# Patient Record
Sex: FEMALE | Race: WHITE | NOT HISPANIC OR LATINO | URBAN - METROPOLITAN AREA
[De-identification: names, ages, dates, MRNs, and addresses within clinical notes are randomized per-mention and may not be internally consistent; named-entity substitution may affect disease eponyms.]

---

## 2024-04-29 ENCOUNTER — TELEMEDICINE (OUTPATIENT)
Dept: PRIMARY CARE | Facility: CLINIC | Age: 34
End: 2024-04-29
Payer: COMMERCIAL

## 2024-04-29 DIAGNOSIS — K12.0 CANKER SORES ORAL: ICD-10-CM

## 2024-04-29 DIAGNOSIS — K21.9 GASTROESOPHAGEAL REFLUX DISEASE, UNSPECIFIED WHETHER ESOPHAGITIS PRESENT: ICD-10-CM

## 2024-04-29 DIAGNOSIS — K14.9 IRRITATION OF TONGUE: Primary | ICD-10-CM

## 2024-04-29 PROCEDURE — 99214 OFFICE O/P EST MOD 30 MIN: CPT | Mod: GT,U1,95 | Performed by: STUDENT IN AN ORGANIZED HEALTH CARE EDUCATION/TRAINING PROGRAM

## 2024-04-29 PROCEDURE — 99214 OFFICE O/P EST MOD 30 MIN: CPT | Performed by: STUDENT IN AN ORGANIZED HEALTH CARE EDUCATION/TRAINING PROGRAM

## 2024-04-29 RX ORDER — PANTOPRAZOLE SODIUM 20 MG/1
20 TABLET, DELAYED RELEASE ORAL
Qty: 30 TABLET | Refills: 0 | Status: SHIPPED | OUTPATIENT
Start: 2024-04-29 | End: 2025-04-29

## 2024-04-29 NOTE — PROGRESS NOTES
Outpatient Visit Note    No chief complaint on file.      With patient's permission, this is a Telemedicine visit with video and audio. The provider and patient participated in this telemedicine encounter.    HPI:  Iliana Kenney is a 34 y.o. female presenting with concern for swollen/inflamed taste buds for the past month.    No known food allergies/sensitivities.  Did notice worsening after Mexican food.  Also with hummus.  No significant mouth/lip/throat/tongue swelling, no shortness of breath or difficulty breathing.  Has not tried taking anything for this issue.  Denies any abdominal pain but states that throat is also irritated.    Mychart Odvv-Sore Throat    Question 4/29/2024  2:54 PM EDT - Filed by Patient   Are you currently experiencing any shortness of breath? No   Are you experiencing severe throat pain and you CANNOT swallow water, your own saliva or keep liquids down? No   Are you experiencing any wheezing? No   When did your symptoms begin? 4/5/2024   Is your temperature greater than 101° F (38.3° C) or not responding to Tylenol or Ibuprofen? No   Do your symptoms include: None of the above   Have you recently been exposed to Strep? No   Have you recently been exposed to COVID-19? No   How would you describe the throat pain? Irritated           Current Medications  No current outpatient medications     Allergies  Not on File     No past medical history on file.   No past surgical history on file.  No family history on file.     Tobacco Use: Low Risk  (2/3/2024)    Received from [facility] [place] [facility]    Patient History     Smoking Tobacco Use: Never     Smokeless Tobacco Use: Never     Passive Exposure: Never        ROS  All pertinent positive symptoms are included in the history of present illness.  All other systems have been reviewed and are negative and noncontributory to this patient's current ailments.    VITAL SIGNS  There were no vitals filed for this visit.  There were no vitals filed  for this visit.   There is no height or weight on file to calculate BMI.   Patient is unable to proivide    PHYSICAL EXAM  GENERAL APPEARANCE:  Alert and oriented x 3, Pleasant and cooperative, No acute distress.   LUNGS:  No conversational dyspnea or cough during encounter.   PSYCH:  appropriate mood and affect, no difficulty with speech.   Telemedicine visit, no other exam component done.      Assessment/Plan   Problem List Items Addressed This Visit    None  Visit Diagnoses         Codes    Irritation of tongue    -  Primary K14.9    Relevant Medications    pantoprazole (Protonix) 20 mg EC tablet    Gastroesophageal reflux disease, unspecified whether esophagitis present     K21.9    Relevant Medications    pantoprazole (Protonix) 20 mg EC tablet    Canker sores oral     K12.0            Additional Visit Plans:    Suspect irritation of tongue and canker sores may be secondary to GERD.  Will treat with pantoprazole.    Patient also counseled on lifestyle measures likely contributing to irritation including avoiding spicy/acidic foods, avoiding dry mouth by practicing good hydration habits and using a bedside humidifier if she potentially sleeps with her mouth open.    Advised in person follow-up/evaluation if symptoms not improving with these measures.    Patient Care Team:  CHRISTINE Agrawal-CNP as PCP - General (Nurse Practitioner)  Obdulio Mina DO as PCP - [employer] PCP    Michelle Rojas MD   04/29/24   3:02 PM

## 2024-05-26 DIAGNOSIS — K21.9 GASTROESOPHAGEAL REFLUX DISEASE, UNSPECIFIED WHETHER ESOPHAGITIS PRESENT: ICD-10-CM

## 2024-05-26 DIAGNOSIS — K14.9 IRRITATION OF TONGUE: ICD-10-CM

## 2024-05-28 RX ORDER — PANTOPRAZOLE SODIUM 20 MG/1
20 TABLET, DELAYED RELEASE ORAL
Qty: 30 TABLET | Refills: 0 | OUTPATIENT
Start: 2024-05-28 | End: 2025-05-28

## 2024-08-26 ENCOUNTER — OFFICE VISIT (OUTPATIENT)
Dept: PRIMARY CARE | Facility: CLINIC | Age: 34
End: 2024-08-26
Payer: COMMERCIAL

## 2024-08-26 VITALS
OXYGEN SATURATION: 99 % | BODY MASS INDEX: 27.82 KG/M2 | DIASTOLIC BLOOD PRESSURE: 80 MMHG | HEART RATE: 91 BPM | RESPIRATION RATE: 20 BRPM | SYSTOLIC BLOOD PRESSURE: 128 MMHG | WEIGHT: 175 LBS

## 2024-08-26 DIAGNOSIS — G43.809 OTHER MIGRAINE WITHOUT STATUS MIGRAINOSUS, NOT INTRACTABLE: Primary | ICD-10-CM

## 2024-08-26 PROCEDURE — 1036F TOBACCO NON-USER: CPT | Performed by: FAMILY MEDICINE

## 2024-08-26 PROCEDURE — 99213 OFFICE O/P EST LOW 20 MIN: CPT | Performed by: FAMILY MEDICINE

## 2024-08-26 RX ORDER — SUMATRIPTAN SUCCINATE 100 MG/1
100 TABLET ORAL ONCE AS NEEDED
Qty: 9 TABLET | Refills: 11 | Status: SHIPPED | OUTPATIENT
Start: 2024-08-26

## 2024-08-26 RX ORDER — SUMATRIPTAN SUCCINATE 100 MG/1
100 TABLET ORAL ONCE AS NEEDED
COMMUNITY
Start: 2023-04-25 | End: 2024-08-26 | Stop reason: SDUPTHER

## 2024-08-26 ASSESSMENT — PATIENT HEALTH QUESTIONNAIRE - PHQ9
2. FEELING DOWN, DEPRESSED OR HOPELESS: NOT AT ALL
1. LITTLE INTEREST OR PLEASURE IN DOING THINGS: NOT AT ALL
SUM OF ALL RESPONSES TO PHQ9 QUESTIONS 1 AND 2: 0

## 2024-08-26 ASSESSMENT — ENCOUNTER SYMPTOMS
LOSS OF SENSATION IN FEET: 0
DEPRESSION: 0
OCCASIONAL FEELINGS OF UNSTEADINESS: 0

## 2024-08-26 ASSESSMENT — PAIN SCALES - GENERAL: PAINLEVEL: 0-NO PAIN

## 2024-08-26 NOTE — PROGRESS NOTES
Subjective   Patient ID: Iliana Kenney is a 34 y.o. female who presents for Med Refill (Pt needs a refill on migraine medication. ).    Med Refill         Review of Systems    Objective   /80   Pulse 91   Resp 20   Wt 79.4 kg (175 lb)   SpO2 99%   BMI 27.82 kg/m²     Physical Exam  Constitutional:       General: She is not in acute distress.     Appearance: Normal appearance.   Cardiovascular:      Rate and Rhythm: Normal rate and regular rhythm.      Heart sounds: No murmur heard.  Pulmonary:      Breath sounds: Normal breath sounds. No wheezing.   Neurological:      Mental Status: She is alert.         Assessment/Plan   Problem List Items Addressed This Visit    None  Visit Diagnoses         Codes    Other migraine without status migrainosus, not intractable    -  Primary G43.801